# Patient Record
Sex: MALE | Race: WHITE | NOT HISPANIC OR LATINO | Employment: UNEMPLOYED | URBAN - METROPOLITAN AREA
[De-identification: names, ages, dates, MRNs, and addresses within clinical notes are randomized per-mention and may not be internally consistent; named-entity substitution may affect disease eponyms.]

---

## 2017-12-16 ENCOUNTER — APPOINTMENT (OUTPATIENT)
Dept: LAB | Age: 22
End: 2017-12-16
Attending: PHYSICIAN ASSISTANT
Payer: COMMERCIAL

## 2017-12-16 ENCOUNTER — TRANSCRIBE ORDERS (OUTPATIENT)
Dept: URGENT CARE | Age: 22
End: 2017-12-16

## 2017-12-16 ENCOUNTER — OFFICE VISIT (OUTPATIENT)
Dept: URGENT CARE | Age: 22
End: 2017-12-16
Payer: COMMERCIAL

## 2017-12-16 DIAGNOSIS — J02.9 ACUTE PHARYNGITIS: ICD-10-CM

## 2017-12-16 LAB — S PYO AG THROAT QL: NEGATIVE

## 2017-12-16 PROCEDURE — 87070 CULTURE OTHR SPECIMN AEROBIC: CPT

## 2017-12-16 PROCEDURE — G0382 LEV 3 HOSP TYPE B ED VISIT: HCPCS | Performed by: FAMILY MEDICINE

## 2017-12-19 LAB — BACTERIA THROAT CULT: NORMAL

## 2017-12-19 NOTE — PROGRESS NOTES
Assessment  1  Viral pharyngitis (462) (J02 9)    Plan  Viral pharyngitis    · Acyclovir 400 MG Oral Tablet; Take 1 tablet every 4 hours   · PredniSONE 10 MG Oral Tablet; TAKE 6 TABLETS TODAY, THEN DECREASE BY 1TABLET EACH DAY UNTIL GONE   · Continue with our present treatment plan ; Status:Complete;   Done: [id number]   · Resume activity to your tolerance ; Status:Complete;   Done: [id number]   · (1) THROAT CULTURE (CULTURE, UPPER RESPIRATORY); Status:Active; Requestedfor:[id number];    · Rapid StrepA- POC; Source:Throat; Status:Resulted - Requires Verification;   Done:[id number] 03:22PM    Discussion/Summary  Medication Side Effects Reviewed: Possible side effects of new medications were reviewed with the patient/guardian today  Understands and agrees with treatment plan: The treatment plan was reviewed with the patient/guardian  The patient/guardian understands and agrees with the treatment plan   Counseling Documentation With Imm: The patient was counseled regarding instructions for management,-- prognosis,-- patient and family education,-- impressions,-- risks and benefits of treatment options,-- importance of compliance with treatment  Follow Up Instructions: Follow Up with your Primary Care Provider in 3-4 days  If your symptoms worsen, go to the nearest [place] [address] Emergency Department  Chief Complaint  1  Sore Throat  Chief Complaint Free Text Note Form: 2 weeks of nasal congestion and feverYesterday, noted lesions in throat  History of Present Illness  HPI: The patient has had 2 weeks of nasal congestion fever sore throat  The patient states that yesterday he notice lesions in his throat and on his mucosa is consistent with a herpes outbreak he had last year and if years before that  He did see infectious disease specialist in [facility] who diagnosed with herpes simplex virus  He states he was told that if it happened again he would need to be on steroids and acyclovir     Sore Throat: [facility] presents with complaints of sore throat  Associated symptoms include odynophagia,-- swollen glands-- and-- fever, but-- no dysphagia,-- no nasal congestion,-- no postnasal drainage,-- no myalgias,-- no drooling,-- no stridor,-- no chills,-- no headache,-- no hoarseness,-- no neck stiffness,-- no ear pain,-- no facial pain,-- no abdominal pain,-- no nausea,-- no vomiting,-- no cough,-- no rash,-- no anorexia-- and-- no fatigue  Review of Systems  Focused-Male:  Constitutional: as noted in HPI   ENT: as noted in HPI  Respiratory: as noted in HPI  Past Medical History  1  History of herpes simplex infection (V12 09) (Z86 19)  Active Problems And Past Medical History Reviewed: The active problems and past medical history were reviewed and updated today  Current Meds   1  Zyrtec TABS; Therapy: (Recorded:[id number]) to Recorded  Medication List Reviewed: The medication list was reviewed and updated today  Allergies  1  Ceclor CAPS   2  Cephalosporins   3  Penicillins    Vitals  Signs   Recorded: [id number] 02:43PM   Temperature: 99 1 F, Temporal  Heart Rate: 100  Respiration: 20  Systolic: 202  Diastolic: 80  Height: 5 ft 8 in  Weight: 155 lb   BMI Calculated: 23 57  BSA Calculated: 1 83  O2 Saturation: 98  Pain Scale: 5    Physical Exam   Constitutional  General appearance: No acute distress, well appearing and well nourished  Eyes  Conjunctiva and lids: No swelling, erythema, or discharge  Pupils and irises: Equal, round and reactive to light  Ears, Nose, Mouth, and Throat  External inspection of ears and nose: Normal    Nasal mucosa, septum, and turbinates: Normal without edema or erythema  -- Bilateral tonsillar erythema with +1 soft tissue swelling  One lesion noted on the left buccal mucosa consistent with a herpetic lesion    Lymphatic  Palpation of lymph nodes in neck: Abnormal   bilateral anterior cervical node enlargement, but-- no posterior cervical node enlargement,-- no submandibular node enlargement-- and-- no supraclavicular node enlargement        Results/Data  Rapid StrepA- POC [id number] 03:22PM Willodean Kawasaki     Test Name Result Flag Reference   Rapid Strep Negative         Signatures   Electronically signed by : Mir Gracia, [facility]; Dec 16 2017  4:36PM EST                       (Author)    Electronically signed by : Codi Dumont DO; Dec 18 2017  3:35PM EST                       (Co-author)

## 2018-01-23 VITALS
RESPIRATION RATE: 20 BRPM | SYSTOLIC BLOOD PRESSURE: 130 MMHG | OXYGEN SATURATION: 98 % | HEIGHT: 68 IN | DIASTOLIC BLOOD PRESSURE: 80 MMHG | TEMPERATURE: 99.1 F | HEART RATE: 100 BPM | WEIGHT: 155 LBS | BODY MASS INDEX: 23.49 KG/M2